# Patient Record
Sex: MALE | Race: WHITE | NOT HISPANIC OR LATINO | URBAN - METROPOLITAN AREA
[De-identification: names, ages, dates, MRNs, and addresses within clinical notes are randomized per-mention and may not be internally consistent; named-entity substitution may affect disease eponyms.]

---

## 2020-10-27 ENCOUNTER — OFFICE (OUTPATIENT)
Dept: URBAN - METROPOLITAN AREA CLINIC 12 | Facility: CLINIC | Age: 50
End: 2020-10-27

## 2020-10-27 VITALS
DIASTOLIC BLOOD PRESSURE: 85 MMHG | WEIGHT: 231 LBS | SYSTOLIC BLOOD PRESSURE: 136 MMHG | OXYGEN SATURATION: 93 % | HEIGHT: 70 IN | HEART RATE: 65 BPM

## 2020-10-27 DIAGNOSIS — R10.11 RIGHT UPPER QUADRANT PAIN: ICD-10-CM

## 2020-10-27 DIAGNOSIS — R14.0 ABDOMINAL DISTENSION (GASEOUS): ICD-10-CM

## 2020-10-27 DIAGNOSIS — K21.9 GASTRO-ESOPHAGEAL REFLUX DISEASE WITHOUT ESOPHAGITIS: ICD-10-CM

## 2020-10-27 DIAGNOSIS — Z86.010 PERSONAL HISTORY OF COLONIC POLYPS: ICD-10-CM

## 2020-10-27 LAB
C-REACTIVE PROTEIN, QUANT: <1 MG/L
ENDOMYSIAL ANTIBODY IGA: NEGATIVE
H PYLORI BREATH TEST: NEGATIVE
H. PYLORI BREATH COLLECTION: (no result)
IMMUNOGLOBULIN A, QN, SERUM: 322 MG/DL (ref 90–386)
T-TRANSGLUTAMINASE (TTG) IGA: <2 U/ML

## 2020-10-27 PROCEDURE — 99204 OFFICE O/P NEW MOD 45 MIN: CPT | Performed by: INTERNAL MEDICINE

## 2020-10-27 RX ORDER — PANTOPRAZOLE SODIUM 40 MG/1
40 TABLET, DELAYED RELEASE ORAL
Qty: 30 | Refills: 11 | Status: COMPLETED
Start: 2020-10-27 | End: 2022-09-01

## 2020-11-05 ENCOUNTER — OFFICE (OUTPATIENT)
Dept: URBAN - METROPOLITAN AREA CLINIC 19 | Facility: CLINIC | Age: 50
End: 2020-11-05

## 2020-11-05 DIAGNOSIS — R10.11 RIGHT UPPER QUADRANT PAIN: ICD-10-CM

## 2020-11-05 DIAGNOSIS — K76.0 FATTY (CHANGE OF) LIVER, NOT ELSEWHERE CLASSIFIED: ICD-10-CM

## 2020-11-05 PROCEDURE — 76700 US EXAM ABDOM COMPLETE: CPT | Performed by: INTERNAL MEDICINE

## 2020-11-16 ENCOUNTER — AMBULATORY SURGICAL CENTER (OUTPATIENT)
Dept: URBAN - METROPOLITAN AREA SURGERY 3 | Facility: SURGERY | Age: 50
End: 2020-11-16

## 2020-11-16 ENCOUNTER — OFFICE (OUTPATIENT)
Dept: URBAN - METROPOLITAN AREA PATHOLOGY 22 | Facility: PATHOLOGY | Age: 50
End: 2020-11-16

## 2020-11-16 VITALS
RESPIRATION RATE: 14 BRPM | DIASTOLIC BLOOD PRESSURE: 72 MMHG | OXYGEN SATURATION: 95 % | OXYGEN SATURATION: 96 % | OXYGEN SATURATION: 97 % | OXYGEN SATURATION: 97 % | SYSTOLIC BLOOD PRESSURE: 112 MMHG | OXYGEN SATURATION: 95 % | HEART RATE: 69 BPM | HEART RATE: 78 BPM | DIASTOLIC BLOOD PRESSURE: 84 MMHG | SYSTOLIC BLOOD PRESSURE: 117 MMHG | OXYGEN SATURATION: 96 % | SYSTOLIC BLOOD PRESSURE: 153 MMHG | DIASTOLIC BLOOD PRESSURE: 84 MMHG | DIASTOLIC BLOOD PRESSURE: 72 MMHG | WEIGHT: 235 LBS | HEART RATE: 75 BPM | HEIGHT: 70 IN | RESPIRATION RATE: 20 BRPM | DIASTOLIC BLOOD PRESSURE: 72 MMHG | OXYGEN SATURATION: 96 % | DIASTOLIC BLOOD PRESSURE: 62 MMHG | TEMPERATURE: 97.7 F | SYSTOLIC BLOOD PRESSURE: 118 MMHG | WEIGHT: 235 LBS | DIASTOLIC BLOOD PRESSURE: 62 MMHG | SYSTOLIC BLOOD PRESSURE: 112 MMHG | SYSTOLIC BLOOD PRESSURE: 106 MMHG | DIASTOLIC BLOOD PRESSURE: 84 MMHG | HEART RATE: 66 BPM | TEMPERATURE: 97.7 F | SYSTOLIC BLOOD PRESSURE: 153 MMHG | DIASTOLIC BLOOD PRESSURE: 73 MMHG | DIASTOLIC BLOOD PRESSURE: 73 MMHG | RESPIRATION RATE: 18 BRPM | TEMPERATURE: 97.7 F | SYSTOLIC BLOOD PRESSURE: 106 MMHG | RESPIRATION RATE: 14 BRPM | TEMPERATURE: 97.4 F | HEART RATE: 66 BPM | DIASTOLIC BLOOD PRESSURE: 62 MMHG | SYSTOLIC BLOOD PRESSURE: 112 MMHG | OXYGEN SATURATION: 94 % | HEART RATE: 78 BPM | SYSTOLIC BLOOD PRESSURE: 118 MMHG | WEIGHT: 235 LBS | SYSTOLIC BLOOD PRESSURE: 153 MMHG | OXYGEN SATURATION: 94 % | HEART RATE: 69 BPM | SYSTOLIC BLOOD PRESSURE: 106 MMHG | SYSTOLIC BLOOD PRESSURE: 117 MMHG | TEMPERATURE: 97.4 F | DIASTOLIC BLOOD PRESSURE: 73 MMHG | OXYGEN SATURATION: 95 % | RESPIRATION RATE: 18 BRPM | RESPIRATION RATE: 20 BRPM | OXYGEN SATURATION: 94 % | HEART RATE: 66 BPM | TEMPERATURE: 97.4 F | HEART RATE: 78 BPM | RESPIRATION RATE: 20 BRPM | SYSTOLIC BLOOD PRESSURE: 118 MMHG | HEIGHT: 70 IN | OXYGEN SATURATION: 97 % | HEART RATE: 75 BPM | RESPIRATION RATE: 18 BRPM | HEIGHT: 70 IN | HEART RATE: 75 BPM | SYSTOLIC BLOOD PRESSURE: 117 MMHG | RESPIRATION RATE: 14 BRPM | HEART RATE: 69 BPM

## 2020-11-16 DIAGNOSIS — K22.8 OTHER SPECIFIED DISEASES OF ESOPHAGUS: ICD-10-CM

## 2020-11-16 DIAGNOSIS — D12.2 BENIGN NEOPLASM OF ASCENDING COLON: ICD-10-CM

## 2020-11-16 DIAGNOSIS — K64.1 SECOND DEGREE HEMORRHOIDS: ICD-10-CM

## 2020-11-16 DIAGNOSIS — K62.89 OTHER SPECIFIED DISEASES OF ANUS AND RECTUM: ICD-10-CM

## 2020-11-16 DIAGNOSIS — K31.89 OTHER DISEASES OF STOMACH AND DUODENUM: ICD-10-CM

## 2020-11-16 DIAGNOSIS — R10.11 RIGHT UPPER QUADRANT PAIN: ICD-10-CM

## 2020-11-16 DIAGNOSIS — Z86.010 PERSONAL HISTORY OF COLONIC POLYPS: ICD-10-CM

## 2020-11-16 DIAGNOSIS — K62.1 RECTAL POLYP: ICD-10-CM

## 2020-11-16 DIAGNOSIS — K31.7 POLYP OF STOMACH AND DUODENUM: ICD-10-CM

## 2020-11-16 DIAGNOSIS — D12.3 BENIGN NEOPLASM OF TRANSVERSE COLON: ICD-10-CM

## 2020-11-16 PROBLEM — K63.5 POLYP OF COLON: Status: ACTIVE | Noted: 2020-11-16

## 2020-11-16 PROCEDURE — 88341 IMHCHEM/IMCYTCHM EA ADD ANTB: CPT | Performed by: INTERNAL MEDICINE

## 2020-11-16 PROCEDURE — 45380 COLONOSCOPY AND BIOPSY: CPT | Performed by: INTERNAL MEDICINE

## 2020-11-16 PROCEDURE — 88342 IMHCHEM/IMCYTCHM 1ST ANTB: CPT | Mod: 59 | Performed by: INTERNAL MEDICINE

## 2020-11-16 PROCEDURE — 88313 SPECIAL STAINS GROUP 2: CPT | Performed by: INTERNAL MEDICINE

## 2020-11-16 PROCEDURE — 43239 EGD BIOPSY SINGLE/MULTIPLE: CPT | Mod: 51 | Performed by: INTERNAL MEDICINE

## 2020-11-16 PROCEDURE — 88305 TISSUE EXAM BY PATHOLOGIST: CPT | Performed by: INTERNAL MEDICINE

## 2021-03-11 ENCOUNTER — OFFICE (OUTPATIENT)
Dept: URBAN - METROPOLITAN AREA TELEHEALTH 10 | Facility: TELEHEALTH | Age: 51
End: 2021-03-11

## 2021-03-11 VITALS — HEIGHT: 70 IN

## 2021-03-11 DIAGNOSIS — D3A.026 BENIGN CARCINOID TUMOR OF THE RECTUM: ICD-10-CM

## 2021-03-11 DIAGNOSIS — K21.9 GASTRO-ESOPHAGEAL REFLUX DISEASE WITHOUT ESOPHAGITIS: ICD-10-CM

## 2021-03-11 DIAGNOSIS — Z86.010 PERSONAL HISTORY OF COLONIC POLYPS: ICD-10-CM

## 2021-03-11 DIAGNOSIS — R10.11 RIGHT UPPER QUADRANT PAIN: ICD-10-CM

## 2021-03-11 DIAGNOSIS — R14.0 ABDOMINAL DISTENSION (GASEOUS): ICD-10-CM

## 2021-03-11 NOTE — SERVICEHPINOTES
Mr. Grimaldo is a 50-year-old man here for follow up. The patient now comes in today and overall is doing very well. He states that he has had some dietary modification which has improved his symptoms. When we saw him back in October 2020 we also recommended a trial of PPI which he states has also helped significantly. He has no further reflux and states that he has no further abdominal pain. Bloating is much improved. He does note that if he stops taking the pantoprazole his reflux will return. He denies any nausea, vomiting, dysphagia, or unintentional weight loss. He is not interested in a CT scan.Previous GI History:KOSTAThe patient has been seen in the past by Dr. William and underwent EGD and colonoscopy in March 2011. At that time EGD did reveal some evidence of esophagitis with biopsies negative for Lerma's esophagus. Colonoscopy did reveal a 5 mm polyp in the cecum and a 9 mm polyp in the rectum which was removed by hot snare. Pathology did reveal some evidence of some eosinophilic inflammation consistent with reflux esophagitis but was negative for Lerma's esophagus. The cecum polyp was a tubular adenoma. 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 He then underwent sigmoidoscopy a few weeks later which only revealed a single punched-out circular ulcer in the mid rectum which was biopsied and only revealed focal granulation tissue with no evidence of any residual carcinoid. he has done relatively well since then and is well overdue for his surveillance colonoscopy.The patient was then seen by us in October 2020 because of some issues with some worsening bloating and right upper quadrant pain. At that time he was drinking anywhere from 10-12 alcoholic beverages a week. Because of his symptoms he underwent basic blood work which was unremarkable including negative H pylori breath test, inflammatory markers, and celiac labs. Abdominal ultrasound only revealed fatty liver. EGD and colonoscopy were performed in November 2020 were EGD only revealed some mild gastric erythema with negative gastric biopsies. There was a small inlet patch. He did have a tongue of erythema at the GE junction which was biopsied and only consistent with gastric cardia type mucosa negative for Lerma's esophagus. 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 Colonoscopy was normal to the terminal ileum with removal of a few adenomatous polyps. There was a rectal scar which was clean and negative for any residual carcinoid.

## 2021-03-11 NOTE — SERVICENOTES
The patient is aware this visit will be billed., The duration of the telehealth visit was 5 minutes and 48 seconds.

## 2022-09-01 ENCOUNTER — OFFICE (OUTPATIENT)
Dept: URBAN - METROPOLITAN AREA CLINIC 11 | Facility: CLINIC | Age: 52
End: 2022-09-01

## 2022-09-01 VITALS
SYSTOLIC BLOOD PRESSURE: 147 MMHG | WEIGHT: 230 LBS | HEART RATE: 56 BPM | DIASTOLIC BLOOD PRESSURE: 103 MMHG | OXYGEN SATURATION: 97 % | HEIGHT: 70 IN | RESPIRATION RATE: 18 BRPM

## 2022-09-01 DIAGNOSIS — K21.9 GASTRO-ESOPHAGEAL REFLUX DISEASE WITHOUT ESOPHAGITIS: ICD-10-CM

## 2022-09-01 DIAGNOSIS — R74.01 ELEVATION OF LEVELS OF LIVER TRANSAMINASE LEVELS: ICD-10-CM

## 2022-09-01 DIAGNOSIS — Z86.010 PERSONAL HISTORY OF COLONIC POLYPS: ICD-10-CM

## 2022-09-01 DIAGNOSIS — Z72.89 OTHER PROBLEMS RELATED TO LIFESTYLE: ICD-10-CM

## 2022-09-01 DIAGNOSIS — R59.0 LOCALIZED ENLARGED LYMPH NODES: ICD-10-CM

## 2022-09-01 DIAGNOSIS — R19.8 OTHER SPECIFIED SYMPTOMS AND SIGNS INVOLVING THE DIGESTIVE S: ICD-10-CM

## 2022-09-01 LAB
ACTIN (SMOOTH MUSCLE) ANTIBODY: 4 UNITS (ref 0–19)
ANTINUCLEAR ANTIBODIES, IFA: NEGATIVE
CBC, PLATELET, NO DIFFERENTIAL: HEMATOCRIT: 45.3 % (ref 37.5–51)
CBC, PLATELET, NO DIFFERENTIAL: HEMOGLOBIN: 15.2 G/DL (ref 13–17.7)
CBC, PLATELET, NO DIFFERENTIAL: MCH: 30.3 PG (ref 26.6–33)
CBC, PLATELET, NO DIFFERENTIAL: MCHC: 33.6 G/DL (ref 31.5–35.7)
CBC, PLATELET, NO DIFFERENTIAL: MCV: 90 FL (ref 79–97)
CBC, PLATELET, NO DIFFERENTIAL: PLATELETS: 275 X10E3/UL (ref 150–450)
CBC, PLATELET, NO DIFFERENTIAL: RBC: 5.01 X10E6/UL (ref 4.14–5.8)
CBC, PLATELET, NO DIFFERENTIAL: RDW: 12.3 % (ref 11.6–15.4)
CBC, PLATELET, NO DIFFERENTIAL: WBC: 5.6 X10E3/UL (ref 3.4–10.8)
CERULOPLASMIN: 19.6 MG/DL (ref 16–31)
FERRITIN: 452 NG/ML — HIGH (ref 30–400)
HBSAG SCREEN: NEGATIVE
HCV ANTIBODY CASCADE(PCR/GENO): HCV AB: <0.1 S/CO RATIO
HEP B CORE AB, IGM: NEGATIVE
HEP B CORE AB, TOT: NEGATIVE
HEP B SURFACE AB: HEP B SURFACE AB, QUAL: REACTIVE
HEPATIC FUNCTION PANEL (7): ALBUMIN: 4.7 G/DL (ref 3.8–4.9)
HEPATIC FUNCTION PANEL (7): ALKALINE PHOSPHATASE: 59 IU/L (ref 44–121)
HEPATIC FUNCTION PANEL (7): ALT (SGPT): 67 IU/L — HIGH (ref 0–44)
HEPATIC FUNCTION PANEL (7): AST (SGOT): 57 IU/L — HIGH (ref 0–40)
HEPATIC FUNCTION PANEL (7): BILIRUBIN, DIRECT: 0.18 MG/DL (ref 0–0.4)
HEPATIC FUNCTION PANEL (7): BILIRUBIN, TOTAL: 0.6 MG/DL (ref 0–1.2)
HEPATIC FUNCTION PANEL (7): PROTEIN, TOTAL: 6.9 G/DL (ref 6–8.5)
INTERPRETATION: (no result)
IRON AND TIBC: IRON BIND.CAP.(TIBC): 305 UG/DL (ref 250–450)
IRON AND TIBC: IRON SATURATION: 37 % (ref 15–55)
IRON AND TIBC: IRON: 114 UG/DL (ref 38–169)
IRON AND TIBC: UIBC: 191 UG/DL (ref 111–343)

## 2022-09-01 PROCEDURE — 99214 OFFICE O/P EST MOD 30 MIN: CPT | Performed by: INTERNAL MEDICINE

## 2022-09-01 RX ORDER — OMEPRAZOLE 20 MG/1
20 CAPSULE, DELAYED RELEASE ORAL
Qty: 30 | Refills: 2 | Status: COMPLETED
Start: 2022-09-01 | End: 2024-02-13

## 2022-09-01 NOTE — SERVICENOTES
To further evaluate the patient's change in bowel habits we will need to check stool studies.  Since this could be related to his heavy alcohol use and pancreatic insufficiency we will check elastase as well. We can consider trial colestipol if stool studies are negative.  We can also consider trial of Xifaxan.  I did also recommend dietary modifications.  If diarrhea does persist we can also consider flexible sigmoidoscopy with biopsies.  He states that he will occasionally have a scant amount of red blood per rectum but usually does not have any red blood per rectum. His liver enzyme elevation is most likely related to his alcohol use but could also be a component of nonalcoholic fatty liver disease as well.  We will check basic hepatitis labs as above including abdominal ultrasound.  If there is signs of possible chronic liver disease or cirrhosis then we will need to proceed with EGD.  We will also check fibroscan to his heavy alcohol use.  The patient gagging does not appear to be GI in nature.  He states he does have some vomiting and retching which will cause a scant amount of red blood but no true hematemesis.  Because of this and the fact that he states that he has had chronic issues with lymph nodes of his neck he would like to be seen by [facility] Thro[facility] doctor.

## 2022-09-01 NOTE — SERVICEHPINOTES
Mr. Grimaldo is a 52-year-old man here for follow up. Mr. Grimaldo the patient now comes in for follow-up of multiple complaints. He states that for the past three months he has had a change in bowel habits to where he now has loose bowel movement after he eats almost anything. He states that usually happens immediately. It can be with any food. He does admit to eating dairy and artificial sweeteners. Denies any weight loss associated with this. He does note some bloating. He also has some occasional flank pain but no true abdominal pain. He was recently seen by his PCP and had a CMP which did reveal elevation of transaminases with AST 95 and ALT 86. The rest of his liver enzymes were normal. The patient also has issues with increased gagging. He states that when he brushes his teeth sometimes he will gag so much that it will cause him to vomit. Sometimes when he drinks water he will gag which causes vomiting as well. Sometimes when he vomits he will note some bright red blood with vomitus but no large volume hematemesis. Denies any recent antibiotics.Previous GI History:brThe patient has been seen in the past by Dr. William and underwent EGD and colonoscopy in March 2011. At that time EGD did reveal some evidence of esophagitis with biopsies negative for Lerma's esophagus. Colonoscopy did reveal a 5 mm polyp in the cecum and a 9 mm polyp in the rectum which was removed by hot snare. Pathology did reveal some evidence of some eosinophilic inflammation consistent with reflux esophagitis but was negative for Lerma's esophagus. The cecum polyp was a tubular adenoma. 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 He then underwent sigmoidoscopy a few weeks later which only revealed a single punched-out circular ulcer in the mid rectum which was biopsied and only revealed focal granulation tissue with no evidence of any residual carcinoid. he has done relatively well since then and is well overdue for his surveillance colonoscopy.The patient was then seen by us in October 2020 because of some issues with some worsening bloating and right upper quadrant pain. At that time he was drinking anywhere from 10-12 alcoholic beverages a week. Because of his symptoms he underwent basic blood work which was unremarkable including negative H pylori breath test, inflammatory markers, and celiac labs. Abdominal ultrasound only revealed fatty liver. EGD and colonoscopy were performed in November 2020 were EGD only revealed some mild gastric erythema with negative gastric biopsies. There was a small inlet patch. He did have a tongue of erythema at the GE junction which was biopsied and only consistent with gastric cardia type mucosa negative for Lerma's esophagus. 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 Colonoscopy was normal to the terminal ileum with removal of a few adenomatous polyps. There was a rectal scar which was clean and negative for any residual carcinoid.

## 2022-09-20 ENCOUNTER — OFFICE (OUTPATIENT)
Dept: URBAN - METROPOLITAN AREA CLINIC 19 | Facility: CLINIC | Age: 52
End: 2022-09-20

## 2022-09-20 DIAGNOSIS — R74.01 ELEVATION OF LEVELS OF LIVER TRANSAMINASE LEVELS: ICD-10-CM

## 2022-09-20 DIAGNOSIS — K76.0 FATTY (CHANGE OF) LIVER, NOT ELSEWHERE CLASSIFIED: ICD-10-CM

## 2022-09-20 PROCEDURE — 76700 US EXAM ABDOM COMPLETE: CPT | Mod: TC | Performed by: INTERNAL MEDICINE

## 2022-09-20 PROCEDURE — 91200 LIVER ELASTOGRAPHY: CPT | Performed by: INTERNAL MEDICINE

## 2024-02-13 ENCOUNTER — OFFICE (OUTPATIENT)
Dept: URBAN - METROPOLITAN AREA CLINIC 12 | Facility: CLINIC | Age: 54
End: 2024-02-13

## 2024-02-13 VITALS
OXYGEN SATURATION: 94 % | HEART RATE: 70 BPM | SYSTOLIC BLOOD PRESSURE: 138 MMHG | HEIGHT: 70 IN | DIASTOLIC BLOOD PRESSURE: 96 MMHG | WEIGHT: 226 LBS

## 2024-02-13 DIAGNOSIS — Z86.010 PERSONAL HISTORY OF COLONIC POLYPS: ICD-10-CM

## 2024-02-13 DIAGNOSIS — Z72.89 OTHER PROBLEMS RELATED TO LIFESTYLE: ICD-10-CM

## 2024-02-13 DIAGNOSIS — R74.01 ELEVATION OF LEVELS OF LIVER TRANSAMINASE LEVELS: ICD-10-CM

## 2024-02-13 DIAGNOSIS — K76.0 FATTY (CHANGE OF) LIVER, NOT ELSEWHERE CLASSIFIED: ICD-10-CM

## 2024-02-13 PROCEDURE — 99214 OFFICE O/P EST MOD 30 MIN: CPT | Performed by: INTERNAL MEDICINE

## 2024-02-13 RX ORDER — PANTOPRAZOLE SODIUM 20 MG/1
20 TABLET, DELAYED RELEASE ORAL
Qty: 30 | Refills: 11 | Status: ACTIVE
Start: 2024-02-13

## 2024-02-13 RX ORDER — POLYETHYLENE GLYCOL 3350, SODIUM SULFATE, SODIUM CHLORIDE, POTASSIUM CHLORIDE, ASCORBIC ACID, SODIUM ASCORBATE 140-9-5.2G
KIT ORAL
Qty: 1 | Refills: 0 | Status: COMPLETED
Start: 2024-02-13 | End: 2024-03-15

## 2024-03-15 ENCOUNTER — OFFICE (OUTPATIENT)
Dept: URBAN - METROPOLITAN AREA PATHOLOGY 12 | Facility: PATHOLOGY | Age: 54
End: 2024-03-15
Payer: COMMERCIAL

## 2024-03-15 ENCOUNTER — AMBULATORY SURGICAL CENTER (OUTPATIENT)
Dept: URBAN - METROPOLITAN AREA SURGERY 3 | Facility: SURGERY | Age: 54
End: 2024-03-15

## 2024-03-15 VITALS
DIASTOLIC BLOOD PRESSURE: 91 MMHG | OXYGEN SATURATION: 95 % | DIASTOLIC BLOOD PRESSURE: 78 MMHG | OXYGEN SATURATION: 94 % | RESPIRATION RATE: 14 BRPM | HEART RATE: 76 BPM | SYSTOLIC BLOOD PRESSURE: 141 MMHG | SYSTOLIC BLOOD PRESSURE: 143 MMHG | DIASTOLIC BLOOD PRESSURE: 91 MMHG | TEMPERATURE: 97.8 F | SYSTOLIC BLOOD PRESSURE: 109 MMHG | HEART RATE: 84 BPM | SYSTOLIC BLOOD PRESSURE: 109 MMHG | DIASTOLIC BLOOD PRESSURE: 91 MMHG | TEMPERATURE: 98.2 F | TEMPERATURE: 98.2 F | SYSTOLIC BLOOD PRESSURE: 126 MMHG | SYSTOLIC BLOOD PRESSURE: 143 MMHG | OXYGEN SATURATION: 94 % | RESPIRATION RATE: 15 BRPM | SYSTOLIC BLOOD PRESSURE: 143 MMHG | RESPIRATION RATE: 15 BRPM | RESPIRATION RATE: 20 BRPM | DIASTOLIC BLOOD PRESSURE: 95 MMHG | RESPIRATION RATE: 15 BRPM | HEART RATE: 75 BPM | DIASTOLIC BLOOD PRESSURE: 95 MMHG | SYSTOLIC BLOOD PRESSURE: 126 MMHG | DIASTOLIC BLOOD PRESSURE: 95 MMHG | DIASTOLIC BLOOD PRESSURE: 87 MMHG | WEIGHT: 218 LBS | HEART RATE: 76 BPM | WEIGHT: 218 LBS | RESPIRATION RATE: 14 BRPM | DIASTOLIC BLOOD PRESSURE: 78 MMHG | TEMPERATURE: 98.2 F | SYSTOLIC BLOOD PRESSURE: 141 MMHG | RESPIRATION RATE: 20 BRPM | SYSTOLIC BLOOD PRESSURE: 125 MMHG | TEMPERATURE: 97.8 F | SYSTOLIC BLOOD PRESSURE: 125 MMHG | DIASTOLIC BLOOD PRESSURE: 83 MMHG | SYSTOLIC BLOOD PRESSURE: 125 MMHG | HEART RATE: 76 BPM | HEART RATE: 77 BPM | OXYGEN SATURATION: 95 % | DIASTOLIC BLOOD PRESSURE: 87 MMHG | SYSTOLIC BLOOD PRESSURE: 141 MMHG | HEART RATE: 77 BPM | TEMPERATURE: 97.8 F | HEIGHT: 70 IN | DIASTOLIC BLOOD PRESSURE: 78 MMHG | RESPIRATION RATE: 14 BRPM | OXYGEN SATURATION: 94 % | HEART RATE: 75 BPM | HEIGHT: 70 IN | RESPIRATION RATE: 20 BRPM | SYSTOLIC BLOOD PRESSURE: 109 MMHG | HEART RATE: 77 BPM | WEIGHT: 218 LBS | DIASTOLIC BLOOD PRESSURE: 87 MMHG | HEIGHT: 70 IN | DIASTOLIC BLOOD PRESSURE: 83 MMHG | OXYGEN SATURATION: 95 % | HEART RATE: 84 BPM | SYSTOLIC BLOOD PRESSURE: 126 MMHG | DIASTOLIC BLOOD PRESSURE: 83 MMHG | HEART RATE: 75 BPM | HEART RATE: 84 BPM

## 2024-03-15 DIAGNOSIS — K64.1 SECOND DEGREE HEMORRHOIDS: ICD-10-CM

## 2024-03-15 DIAGNOSIS — K63.5 POLYP OF COLON: ICD-10-CM

## 2024-03-15 DIAGNOSIS — Z09 ENCOUNTER FOR FOLLOW-UP EXAMINATION AFTER COMPLETED TREATMEN: ICD-10-CM

## 2024-03-15 DIAGNOSIS — D12.4 BENIGN NEOPLASM OF DESCENDING COLON: ICD-10-CM

## 2024-03-15 DIAGNOSIS — D12.3 BENIGN NEOPLASM OF TRANSVERSE COLON: ICD-10-CM

## 2024-03-15 DIAGNOSIS — Z86.010 PERSONAL HISTORY OF COLONIC POLYPS: ICD-10-CM

## 2024-03-15 PROCEDURE — 45380 COLONOSCOPY AND BIOPSY: CPT | Mod: 59 | Performed by: INTERNAL MEDICINE

## 2024-03-15 PROCEDURE — 45385 COLONOSCOPY W/LESION REMOVAL: CPT | Performed by: INTERNAL MEDICINE

## 2024-03-15 PROCEDURE — 88305 TISSUE EXAM BY PATHOLOGIST: CPT | Performed by: PATHOLOGY

## 2024-03-19 LAB
GASTRO ONE PATHOLOGY: PDF REPORT: (no result)

## 2025-02-12 ENCOUNTER — OFFICE (OUTPATIENT)
Dept: URBAN - METROPOLITAN AREA CLINIC 11 | Facility: CLINIC | Age: 55
End: 2025-02-12

## 2025-02-12 VITALS
HEIGHT: 70 IN | WEIGHT: 238 LBS | SYSTOLIC BLOOD PRESSURE: 130 MMHG | HEART RATE: 60 BPM | DIASTOLIC BLOOD PRESSURE: 75 MMHG | OXYGEN SATURATION: 96 %

## 2025-02-12 DIAGNOSIS — D3A.026 BENIGN CARCINOID TUMOR OF THE RECTUM: ICD-10-CM

## 2025-02-12 DIAGNOSIS — R74.01 ELEVATION OF LEVELS OF LIVER TRANSAMINASE LEVELS: ICD-10-CM

## 2025-02-12 DIAGNOSIS — K76.0 FATTY (CHANGE OF) LIVER, NOT ELSEWHERE CLASSIFIED: ICD-10-CM

## 2025-02-12 PROCEDURE — 99213 OFFICE O/P EST LOW 20 MIN: CPT | Performed by: INTERNAL MEDICINE

## 2025-02-12 RX ORDER — PANTOPRAZOLE 20 MG/1
20 TABLET, DELAYED RELEASE ORAL
Qty: 30 | Refills: 11 | Status: ACTIVE
Start: 2024-02-13

## 2025-02-13 LAB
CBC, PLATELET, NO DIFFERENTIAL: HEMATOCRIT: 45.3 % (ref 37.5–51)
CBC, PLATELET, NO DIFFERENTIAL: HEMOGLOBIN: 15.5 G/DL (ref 13–17.7)
CBC, PLATELET, NO DIFFERENTIAL: MCH: 31.3 PG (ref 26.6–33)
CBC, PLATELET, NO DIFFERENTIAL: MCHC: 34.2 G/DL (ref 31.5–35.7)
CBC, PLATELET, NO DIFFERENTIAL: MCV: 91 FL (ref 79–97)
CBC, PLATELET, NO DIFFERENTIAL: PLATELETS: 266 X10E3/UL (ref 150–450)
CBC, PLATELET, NO DIFFERENTIAL: RBC: 4.96 X10E6/UL (ref 4.14–5.8)
CBC, PLATELET, NO DIFFERENTIAL: RDW: 12.7 % (ref 11.6–15.4)
CBC, PLATELET, NO DIFFERENTIAL: WBC: 6.1 X10E3/UL (ref 3.4–10.8)
COMP. METABOLIC PANEL (14): ALBUMIN: 4.3 G/DL (ref 3.8–4.9)
COMP. METABOLIC PANEL (14): ALKALINE PHOSPHATASE: 106 IU/L (ref 44–121)
COMP. METABOLIC PANEL (14): ALT (SGPT): 38 IU/L (ref 0–44)
COMP. METABOLIC PANEL (14): AST (SGOT): 32 IU/L (ref 0–40)
COMP. METABOLIC PANEL (14): BILIRUBIN, TOTAL: 0.4 MG/DL (ref 0–1.2)
COMP. METABOLIC PANEL (14): BUN/CREATININE RATIO: 20 (ref 9–20)
COMP. METABOLIC PANEL (14): BUN: 21 MG/DL (ref 6–24)
COMP. METABOLIC PANEL (14): CALCIUM: 9.3 MG/DL (ref 8.7–10.2)
COMP. METABOLIC PANEL (14): CARBON DIOXIDE, TOTAL: 24 MMOL/L (ref 20–29)
COMP. METABOLIC PANEL (14): CHLORIDE: 104 MMOL/L (ref 96–106)
COMP. METABOLIC PANEL (14): CREATININE: 1.07 MG/DL (ref 0.76–1.27)
COMP. METABOLIC PANEL (14): EGFR: 83 ML/MIN/1.73 (ref 59–?)
COMP. METABOLIC PANEL (14): GLOBULIN, TOTAL: 2.3 G/DL (ref 1.5–4.5)
COMP. METABOLIC PANEL (14): GLUCOSE: 93 MG/DL (ref 70–99)
COMP. METABOLIC PANEL (14): POTASSIUM: 4.5 MMOL/L (ref 3.5–5.2)
COMP. METABOLIC PANEL (14): PROTEIN, TOTAL: 6.6 G/DL (ref 6–8.5)
COMP. METABOLIC PANEL (14): SODIUM: 138 MMOL/L (ref 134–144)
LIPID PANEL: CHOLESTEROL, TOTAL: 151 MG/DL (ref 100–199)
LIPID PANEL: HDL CHOLESTEROL: 40 MG/DL (ref 39–?)
LIPID PANEL: LDL CHOL CALC (NIH): 73 MG/DL (ref 0–99)
LIPID PANEL: TRIGLYCERIDES: 228 MG/DL — HIGH (ref 0–149)
LIPID PANEL: VLDL CHOLESTEROL CAL: 38 MG/DL (ref 5–40)
PROTHROMBIN TIME (PT): INR: 0.9 (ref 0.9–1.2)
PROTHROMBIN TIME (PT): PROTHROMBIN TIME: 10.5 SEC (ref 9.1–12)